# Patient Record
Sex: MALE | Race: BLACK OR AFRICAN AMERICAN | Employment: FULL TIME | URBAN - METROPOLITAN AREA
[De-identification: names, ages, dates, MRNs, and addresses within clinical notes are randomized per-mention and may not be internally consistent; named-entity substitution may affect disease eponyms.]

---

## 2020-07-13 PROBLEM — R42 DIZZINESS: Status: ACTIVE | Noted: 2020-07-13

## 2021-07-06 PROBLEM — Z00.00 PREVENTATIVE HEALTH CARE: Status: ACTIVE | Noted: 2021-07-06

## 2021-07-06 PROBLEM — E78.5 DYSLIPIDEMIA: Status: ACTIVE | Noted: 2021-07-06

## 2021-07-06 PROBLEM — R79.89 ELEVATED LFTS: Status: ACTIVE | Noted: 2021-07-06

## 2021-07-06 PROBLEM — R42 DIZZINESS: Status: RESOLVED | Noted: 2020-07-13 | Resolved: 2021-07-06

## 2021-07-27 PROBLEM — R97.20 ELEVATED PSA: Status: ACTIVE | Noted: 2021-07-27

## 2021-07-27 PROBLEM — E55.9 VITAMIN D DEFICIENCY: Status: ACTIVE | Noted: 2021-07-27

## 2021-07-28 PROBLEM — R80.0 ISOLATED PROTEINURIA: Status: ACTIVE | Noted: 2021-07-28

## 2021-08-02 PROBLEM — R19.02 LEFT UPPER QUADRANT ABDOMINAL MASS: Status: ACTIVE | Noted: 2021-08-02

## 2021-08-29 PROBLEM — I70.0 ABDOMINAL AORTIC ATHEROSCLEROSIS (HCC): Status: ACTIVE | Noted: 2021-08-29

## 2021-10-05 PROBLEM — I10 ESSENTIAL HYPERTENSION: Status: ACTIVE | Noted: 2021-10-05

## 2021-10-11 PROBLEM — R93.1 AGATSTON CAC SCORE, <100: Status: ACTIVE | Noted: 2021-10-11

## 2022-02-16 PROBLEM — R20.2 PARESTHESIA: Status: ACTIVE | Noted: 2022-02-16

## 2022-02-21 PROBLEM — C61 PROSTATE CANCER (HCC): Status: ACTIVE | Noted: 2022-02-21

## 2022-02-21 PROBLEM — R97.20 ELEVATED PSA: Status: RESOLVED | Noted: 2021-07-27 | Resolved: 2022-02-21

## 2022-03-04 RX ORDER — HEPARIN SODIUM 5000 [USP'U]/ML
5000 INJECTION, SOLUTION INTRAVENOUS; SUBCUTANEOUS ONCE
Status: CANCELLED | OUTPATIENT
Start: 2022-03-04 | End: 2022-03-04

## 2022-03-09 ENCOUNTER — ANESTHESIA EVENT (OUTPATIENT)
Dept: SURGERY | Facility: HOSPITAL | Age: 47
End: 2022-03-09
Payer: COMMERCIAL

## 2022-03-10 ENCOUNTER — ANESTHESIA (OUTPATIENT)
Dept: SURGERY | Facility: HOSPITAL | Age: 47
End: 2022-03-10
Payer: COMMERCIAL

## 2022-03-10 ENCOUNTER — HOSPITAL ENCOUNTER (OUTPATIENT)
Facility: HOSPITAL | Age: 47
Setting detail: HOSPITAL OUTPATIENT SURGERY
Discharge: HOME | End: 2022-03-10
Attending: UROLOGY | Admitting: UROLOGY
Payer: COMMERCIAL

## 2022-03-10 VITALS
BODY MASS INDEX: 24.88 KG/M2 | SYSTOLIC BLOOD PRESSURE: 148 MMHG | TEMPERATURE: 100 F | HEIGHT: 68 IN | WEIGHT: 164.13 LBS | RESPIRATION RATE: 16 BRPM | DIASTOLIC BLOOD PRESSURE: 84 MMHG | OXYGEN SATURATION: 98 % | HEART RATE: 92 BPM

## 2022-03-10 DIAGNOSIS — C61 PROSTATE CANCER (HCC): ICD-10-CM

## 2022-03-10 LAB
ANTIBODY SCREEN: NEGATIVE
RH BLOOD TYPE: POSITIVE

## 2022-03-10 PROCEDURE — 88309 TISSUE EXAM BY PATHOLOGIST: CPT | Performed by: UROLOGY

## 2022-03-10 PROCEDURE — 0VT04ZZ RESECTION OF PROSTATE, PERCUTANEOUS ENDOSCOPIC APPROACH: ICD-10-PCS | Performed by: UROLOGY

## 2022-03-10 PROCEDURE — 76942 ECHO GUIDE FOR BIOPSY: CPT | Performed by: ANESTHESIOLOGY

## 2022-03-10 PROCEDURE — 86901 BLOOD TYPING SEROLOGIC RH(D): CPT | Performed by: UROLOGY

## 2022-03-10 PROCEDURE — 88331 PATH CONSLTJ SURG 1 BLK 1SPC: CPT | Performed by: UROLOGY

## 2022-03-10 PROCEDURE — 8E0W4CZ ROBOTIC ASSISTED PROCEDURE OF TRUNK REGION, PERCUTANEOUS ENDOSCOPIC APPROACH: ICD-10-PCS | Performed by: UROLOGY

## 2022-03-10 PROCEDURE — 86850 RBC ANTIBODY SCREEN: CPT | Performed by: UROLOGY

## 2022-03-10 PROCEDURE — 86900 BLOOD TYPING SEROLOGIC ABO: CPT | Performed by: UROLOGY

## 2022-03-10 PROCEDURE — 0TSD4ZZ REPOSITION URETHRA, PERCUTANEOUS ENDOSCOPIC APPROACH: ICD-10-PCS | Performed by: UROLOGY

## 2022-03-10 PROCEDURE — 88307 TISSUE EXAM BY PATHOLOGIST: CPT | Performed by: UROLOGY

## 2022-03-10 PROCEDURE — 07BC4ZX EXCISION OF PELVIS LYMPHATIC, PERCUTANEOUS ENDOSCOPIC APPROACH, DIAGNOSTIC: ICD-10-PCS | Performed by: UROLOGY

## 2022-03-10 PROCEDURE — 88305 TISSUE EXAM BY PATHOLOGIST: CPT | Performed by: UROLOGY

## 2022-03-10 PROCEDURE — 88344 IMHCHEM/IMCYTCHM EA MLT ANTB: CPT | Performed by: UROLOGY

## 2022-03-10 RX ORDER — MEPERIDINE HYDROCHLORIDE 25 MG/ML
12.5 INJECTION INTRAMUSCULAR; INTRAVENOUS; SUBCUTANEOUS AS NEEDED
Status: DISCONTINUED | OUTPATIENT
Start: 2022-03-10 | End: 2022-03-10

## 2022-03-10 RX ORDER — CEFAZOLIN SODIUM/WATER 2 G/20 ML
2 SYRINGE (ML) INTRAVENOUS ONCE
Status: COMPLETED | OUTPATIENT
Start: 2022-03-10 | End: 2022-03-10

## 2022-03-10 RX ORDER — KETOROLAC TROMETHAMINE 30 MG/ML
INJECTION, SOLUTION INTRAMUSCULAR; INTRAVENOUS AS NEEDED
Status: DISCONTINUED | OUTPATIENT
Start: 2022-03-10 | End: 2022-03-10 | Stop reason: SURG

## 2022-03-10 RX ORDER — KETAMINE HYDROCHLORIDE 50 MG/ML
INJECTION, SOLUTION, CONCENTRATE INTRAMUSCULAR; INTRAVENOUS AS NEEDED
Status: DISCONTINUED | OUTPATIENT
Start: 2022-03-10 | End: 2022-03-10 | Stop reason: SURG

## 2022-03-10 RX ORDER — GLYCOPYRROLATE 0.2 MG/ML
INJECTION, SOLUTION INTRAMUSCULAR; INTRAVENOUS AS NEEDED
Status: DISCONTINUED | OUTPATIENT
Start: 2022-03-10 | End: 2022-03-10 | Stop reason: SURG

## 2022-03-10 RX ORDER — HYDROMORPHONE HYDROCHLORIDE 1 MG/ML
0.4 INJECTION, SOLUTION INTRAMUSCULAR; INTRAVENOUS; SUBCUTANEOUS EVERY 5 MIN PRN
Status: DISCONTINUED | OUTPATIENT
Start: 2022-03-10 | End: 2022-03-10

## 2022-03-10 RX ORDER — ONDANSETRON 2 MG/ML
INJECTION INTRAMUSCULAR; INTRAVENOUS AS NEEDED
Status: DISCONTINUED | OUTPATIENT
Start: 2022-03-10 | End: 2022-03-10 | Stop reason: SURG

## 2022-03-10 RX ORDER — MIDAZOLAM HYDROCHLORIDE 1 MG/ML
INJECTION INTRAMUSCULAR; INTRAVENOUS AS NEEDED
Status: DISCONTINUED | OUTPATIENT
Start: 2022-03-10 | End: 2022-03-10 | Stop reason: SURG

## 2022-03-10 RX ORDER — ENOXAPARIN SODIUM 100 MG/ML
40 INJECTION SUBCUTANEOUS ONCE
Status: COMPLETED | OUTPATIENT
Start: 2022-03-10 | End: 2022-03-10

## 2022-03-10 RX ORDER — NALOXONE HYDROCHLORIDE 0.4 MG/ML
80 INJECTION, SOLUTION INTRAMUSCULAR; INTRAVENOUS; SUBCUTANEOUS AS NEEDED
Status: DISCONTINUED | OUTPATIENT
Start: 2022-03-10 | End: 2022-03-10

## 2022-03-10 RX ORDER — BUPIVACAINE HYDROCHLORIDE 2.5 MG/ML
INJECTION, SOLUTION EPIDURAL; INFILTRATION; INTRACAUDAL AS NEEDED
Status: DISCONTINUED | OUTPATIENT
Start: 2022-03-10 | End: 2022-03-10 | Stop reason: HOSPADM

## 2022-03-10 RX ORDER — ACETAMINOPHEN 500 MG
1000 TABLET ORAL ONCE AS NEEDED
Status: DISCONTINUED | OUTPATIENT
Start: 2022-03-10 | End: 2022-03-10

## 2022-03-10 RX ORDER — ONDANSETRON 2 MG/ML
4 INJECTION INTRAMUSCULAR; INTRAVENOUS AS NEEDED
Status: DISCONTINUED | OUTPATIENT
Start: 2022-03-10 | End: 2022-03-10

## 2022-03-10 RX ORDER — SODIUM CHLORIDE, SODIUM LACTATE, POTASSIUM CHLORIDE, CALCIUM CHLORIDE 600; 310; 30; 20 MG/100ML; MG/100ML; MG/100ML; MG/100ML
INJECTION, SOLUTION INTRAVENOUS CONTINUOUS
Status: DISCONTINUED | OUTPATIENT
Start: 2022-03-10 | End: 2022-03-10

## 2022-03-10 RX ORDER — DEXAMETHASONE SODIUM PHOSPHATE 10 MG/ML
INJECTION, SOLUTION INTRAMUSCULAR; INTRAVENOUS AS NEEDED
Status: DISCONTINUED | OUTPATIENT
Start: 2022-03-10 | End: 2022-03-10 | Stop reason: SURG

## 2022-03-10 RX ORDER — DEXAMETHASONE SODIUM PHOSPHATE 4 MG/ML
VIAL (ML) INJECTION AS NEEDED
Status: DISCONTINUED | OUTPATIENT
Start: 2022-03-10 | End: 2022-03-10 | Stop reason: SURG

## 2022-03-10 RX ORDER — ACETAMINOPHEN 500 MG
1000 TABLET ORAL ONCE
Status: DISCONTINUED | OUTPATIENT
Start: 2022-03-10 | End: 2022-03-10 | Stop reason: HOSPADM

## 2022-03-10 RX ORDER — HYDROCODONE BITARTRATE AND ACETAMINOPHEN 5; 325 MG/1; MG/1
2 TABLET ORAL AS NEEDED
Status: DISCONTINUED | OUTPATIENT
Start: 2022-03-10 | End: 2022-03-10

## 2022-03-10 RX ORDER — LIDOCAINE HYDROCHLORIDE 10 MG/ML
INJECTION, SOLUTION EPIDURAL; INFILTRATION; INTRACAUDAL; PERINEURAL AS NEEDED
Status: DISCONTINUED | OUTPATIENT
Start: 2022-03-10 | End: 2022-03-10 | Stop reason: SURG

## 2022-03-10 RX ORDER — HYDROCODONE BITARTRATE AND ACETAMINOPHEN 5; 325 MG/1; MG/1
1 TABLET ORAL AS NEEDED
Status: DISCONTINUED | OUTPATIENT
Start: 2022-03-10 | End: 2022-03-10

## 2022-03-10 RX ORDER — BUPIVACAINE HYDROCHLORIDE 2.5 MG/ML
INJECTION, SOLUTION EPIDURAL; INFILTRATION; INTRACAUDAL AS NEEDED
Status: DISCONTINUED | OUTPATIENT
Start: 2022-03-10 | End: 2022-03-10 | Stop reason: SURG

## 2022-03-10 RX ORDER — ROCURONIUM BROMIDE 10 MG/ML
INJECTION, SOLUTION INTRAVENOUS AS NEEDED
Status: DISCONTINUED | OUTPATIENT
Start: 2022-03-10 | End: 2022-03-10 | Stop reason: SURG

## 2022-03-10 RX ORDER — DIPHENHYDRAMINE HYDROCHLORIDE 50 MG/ML
12.5 INJECTION INTRAMUSCULAR; INTRAVENOUS AS NEEDED
Status: DISCONTINUED | OUTPATIENT
Start: 2022-03-10 | End: 2022-03-10

## 2022-03-10 RX ORDER — METOCLOPRAMIDE HYDROCHLORIDE 5 MG/ML
INJECTION INTRAMUSCULAR; INTRAVENOUS AS NEEDED
Status: DISCONTINUED | OUTPATIENT
Start: 2022-03-10 | End: 2022-03-10 | Stop reason: SURG

## 2022-03-10 RX ORDER — NEOSTIGMINE METHYLSULFATE 1 MG/ML
INJECTION INTRAVENOUS AS NEEDED
Status: DISCONTINUED | OUTPATIENT
Start: 2022-03-10 | End: 2022-03-10 | Stop reason: SURG

## 2022-03-10 RX ADMIN — KETAMINE HYDROCHLORIDE 50 MG: 50 INJECTION, SOLUTION, CONCENTRATE INTRAMUSCULAR; INTRAVENOUS at 08:30:00

## 2022-03-10 RX ADMIN — METOCLOPRAMIDE HYDROCHLORIDE 10 MG: 5 INJECTION INTRAMUSCULAR; INTRAVENOUS at 08:20:00

## 2022-03-10 RX ADMIN — SODIUM CHLORIDE, SODIUM LACTATE, POTASSIUM CHLORIDE, CALCIUM CHLORIDE: 600; 310; 30; 20 INJECTION, SOLUTION INTRAVENOUS at 11:49:00

## 2022-03-10 RX ADMIN — SODIUM CHLORIDE, SODIUM LACTATE, POTASSIUM CHLORIDE, CALCIUM CHLORIDE: 600; 310; 30; 20 INJECTION, SOLUTION INTRAVENOUS at 09:44:00

## 2022-03-10 RX ADMIN — ROCURONIUM BROMIDE 50 MG: 10 INJECTION, SOLUTION INTRAVENOUS at 08:09:00

## 2022-03-10 RX ADMIN — LIDOCAINE HYDROCHLORIDE 50 MG: 10 INJECTION, SOLUTION EPIDURAL; INFILTRATION; INTRACAUDAL; PERINEURAL at 08:04:00

## 2022-03-10 RX ADMIN — MIDAZOLAM HYDROCHLORIDE 2 MG: 1 INJECTION INTRAMUSCULAR; INTRAVENOUS at 08:04:00

## 2022-03-10 RX ADMIN — NEOSTIGMINE METHYLSULFATE 2 MG: 1 INJECTION INTRAVENOUS at 11:47:00

## 2022-03-10 RX ADMIN — KETOROLAC TROMETHAMINE 30 MG: 30 INJECTION, SOLUTION INTRAMUSCULAR; INTRAVENOUS at 11:37:00

## 2022-03-10 RX ADMIN — DEXAMETHASONE SODIUM PHOSPHATE 2 MG: 10 INJECTION, SOLUTION INTRAMUSCULAR; INTRAVENOUS at 08:15:00

## 2022-03-10 RX ADMIN — ONDANSETRON 4 MG: 2 INJECTION INTRAMUSCULAR; INTRAVENOUS at 08:20:00

## 2022-03-10 RX ADMIN — CEFAZOLIN SODIUM/WATER 2 G: 2 G/20 ML SYRINGE (ML) INTRAVENOUS at 08:20:00

## 2022-03-10 RX ADMIN — DEXAMETHASONE SODIUM PHOSPHATE 4 MG: 4 MG/ML VIAL (ML) INJECTION at 08:20:00

## 2022-03-10 RX ADMIN — DEXAMETHASONE SODIUM PHOSPHATE 2 MG: 10 INJECTION, SOLUTION INTRAMUSCULAR; INTRAVENOUS at 08:13:00

## 2022-03-10 RX ADMIN — BUPIVACAINE HYDROCHLORIDE 30 ML: 2.5 INJECTION, SOLUTION EPIDURAL; INFILTRATION; INTRACAUDAL at 08:13:00

## 2022-03-10 RX ADMIN — BUPIVACAINE HYDROCHLORIDE 30 ML: 2.5 INJECTION, SOLUTION EPIDURAL; INFILTRATION; INTRACAUDAL at 08:15:00

## 2022-03-10 RX ADMIN — GLYCOPYRROLATE 0.4 MG: 0.2 INJECTION, SOLUTION INTRAMUSCULAR; INTRAVENOUS at 11:47:00

## 2022-03-10 RX ADMIN — SODIUM CHLORIDE, SODIUM LACTATE, POTASSIUM CHLORIDE, CALCIUM CHLORIDE: 600; 310; 30; 20 INJECTION, SOLUTION INTRAVENOUS at 08:04:00

## 2022-03-10 NOTE — ANESTHESIA PROCEDURE NOTES
Airway  Date/Time: 3/10/2022 8:10 AM  Urgency: elective      General Information and Staff    Patient location during procedure: OR  Anesthesiologist: Miguel Angel Calabrese MD  Performed: anesthesiologist     Indications and Patient Condition  Indications for airway management: anesthesia  Spontaneous Ventilation: absent  Sedation level: deep  Preoxygenated: yes  Patient position: sniffing  Mask difficulty assessment: 1 - vent by mask    Final Airway Details  Final airway type: endotracheal airway      Successful airway: ETT  Cuffed: yes   Successful intubation technique: Video laryngoscopy  Endotracheal tube insertion site: oral  Blade size: #3  ETT size (mm): 7.0    Cormack-Lehane Classification: grade I - full view of glottis  Placement verified by: chest auscultation and capnometry   Cuff volume (mL): 5  Measured from: lips  ETT to lips (cm): 22  Number of attempts at approach: 1    Additional Comments  VC clean, passed smoothly, atraumatically. OGT and soft bite block to molars.  Dentition unchanged

## 2022-03-10 NOTE — ANESTHESIA PROCEDURE NOTES
Regional Block  Performed by: Thor Eid MD  Authorized by: Thor Eid MD       General Information and Staff    Start Time:  3/10/2022 8:14 AM  End Time:  3/10/2022 8:15 AM  Anesthesiologist:  Thor Eid MD  Performed by: Anesthesiologist  Patient Location:  OR    Block Placement: Post Induction  Site Identification: real time ultrasound guided and image stored and retrievable    Block site/laterality marked before start: site marked  Reason for Block: at surgeon's request and post-op pain management    Preanesthetic Checklist: 2 patient identifers, IV checked, risks and benefits discussed, monitors and equipment checked, pre-op evaluation, timeout performed, anesthesia consent, sterile technique used, no prohibitive neurological deficits and no local skin infection at insertion site      Procedure Details    Patient Position:  Supine  Prep: ChloraPrep    Monitoring:  Cardiac monitor, continuous pulse ox and blood pressure cuff  Block Type:  TAP  Laterality:  Left  Injection Technique:  Single-shot    Needle    Needle Type:  Short-bevel and echogenic  Needle Gauge:  21 G  Needle Length:  100 mm  Needle Localization:  Ultrasound guidance  Reason for Ultrasound Use: appropriate spread of the medication was noted in real time and no ultrasound evidence of intravascular and/or intraneural injection            Assessment    Injection Assessment:  Good spread noted, negative resistance, negative aspiration for heme, incremental injection and low pressure  Heart Rate Change: No    - Patient tolerated block procedure well without evidence of immediate block related complications.      Medications      Additional Comments    Medication:  Bupivacaine 0.25% 30mL with 2mg PF dex

## 2022-03-10 NOTE — OPERATIVE REPORT
ROBOTIC-ASSISTED LAPAROSCOPIC RADICAL PROSTATECTOMY (RALP) OPERATIVE NOTE    PATIENT NAME: Natalya Andrade  YOB: 1975  DATE OF SERVICE: 3/10/2022    PREOPERATIVE DIAGNOSIS:  Intermediate risk prostate cancer    POSTOPERATIVE DIAGNOSIS:  Same    PROCEDURES PERFORMED:  1. Robotic-assisted laparoscopic radical prostatectomy (CPT: 25929)  2. Bilateral pelvic lymphadenectomy  3. Anterior urethropexy (CPT: 86204)    SURGEON:  Vu Edmond MD    ASSISTANTS:  SASHA Garnett    ANESTHESIA:  General Endotracheal    ANTIBIOTIC PROPHYLAXIS:  Ancef    SPECIMENS:  1. Prostate and seminal vesicles  2. Bilateral pelvic lymph nodes  3. Periprostatic fat  4. Bladder neck margin (for frozen section)  5. Final Bladder neck margin    ESTIMATED BLOOD LOSS:  50mL    DRAINS:  16F dietz catheter (15mL in the balloon)    COMPLICATIONS:  None     INDICATIONS FOR PROCEDURE:  Mr. Silvia Pool is a 51year old gentleman who was found to have unfavorable intermediate risk prostate adenocarcinoma. After being counseled about his treatment options, he elected to proceed with a radical prostatectomy with bilateral pelvic lymphadenectomy. See my pre-operative and office notes for procedural counseling, including the risks of the procedure. DESCRIPTION OF PROCEDURE:  After reviewing the indications for the procedure, informed consent was reviewed and signed by the patient. The patient was brought to the operating room and placed in the supine position on the OR table. SCD's were applied and all pressure points were carefully padded. At this point, general endotracheal anesthesia was successfully induced. The patient was then given perioperative antibiotics and subcutaneous enoxaparin prior to initiation of the procedure. At this point, he remained in the supine position and his abdomen was prepped with chlorhexidine and his genitals and upper thighs were prepped with betadine solution.   He was draped in the usual sterile fashion. A 16F dietz catheter was placed using a sterile technique and the balloon was inflated with 15cc of sterile water. The bladder was drained fully. We placed the patient in a steep Trendelenburg position to ensure there was no motion of the patient on the bed. At this point, a procedural timeout was performed where the patient and procedure were correctly identified using the patient's full name and date of birth. A Veress needle was inserted above the umbilicus into the peritoneal cavity. A drop test was performed to ensure there was no return of blood or enteric contents. At this point insufflation was initiated and there were acceptably low opening pressures. The abdomen was then insufflated to 15mmHg. At this point a transverse 8mm incision was made in the supraumbilical region through which a non-bladed 8mm trocar was inserted into the peritoneal cavity. The camera was then passed through this port and the peritoneum was inspected. There was no evidence of intraabdominal pathology. There were No adhesions noted. At this point, the remainder of the working ports were placed under direct vision. Two 8mm ports were placed in the left lower quadrant, an 8mm port was placed in the right lower quadrant, and a 12mm assistant port was placed in the far right lower quadrant. Finally, a 5mm assistant port was placed in the right upper quadrant. There was no evidence of injury to the intraperitoneal contents during port placement. The patient was again placed in steep Trendelenburg position and the robot was then successfully docked. All instruments were then placed under direct vision. I began by taking down the attachments of the sigmoid colon to the left pelvic sidewall in order to identify the external iliac vessels and ensure mobility of the sigmoid colon and rectum.      Next, the posterior peritoneum was incised using electrocautery in order to enter the rectovesical cul-de-sac and identify the vas deferens and seminal vesicles bilaterally. These structures were both dissected out carefully and the vasa were transected near the tips of the seminal vesicles. Minimal use of cautery was employed near the tips of the seminal vesicles in order to avoid any injury to the neurovascular bundles. Once the seminal vesicles were dissected free, I then created a plane between the rectum and posterior prostate using a combination of blunt and sharp dissection. Denonvilliers fascia was identified and dissection was carried distally to the posterior apex of the prostate. After completing the posterior dissection, attention was the turned to mobilizing the bladder. The right medial umbilical ligament was identified and grasped. Using electrocautery, the space of Retzius was entered. Dissection was carried laterally to the vas deferens and distally to the endopelvic fascia. I then performed the same procedure on the left side of the bladder. Finally, the urachus was divided using electrocautery to drop the bladder posteriorly. The prostate was then defatted using blunt dissection and electrocautery to fully expose the endopelvic fascia. This fat was sent off for pathologic evaluation as \"periprostatic fat. \"  Next, the endopelvic fascia on the right was sharply entered. The levator muscles were swept laterally, with care not to injure them. Dissection was carried towards the apex of the prostate, exposing the lateral aspect of the dorsal venous complex. Chris's ligament was divided with gentle bipolar electrocautery. The same procedure was then performed on the left side. After exposure of the dorsal venous complex, it was suture-ligated using a 2-0 V-Lock suture in a figure-of-eight fashion. An anterior urethropexy was performed using the same suture to lift the urethra anteriorly and tack it to the periosteum of the pubic bone.       Attention was then turned to  the bladder neck from the base of the prostate. The dietz catheter was placed on gentle traction to delineate the junction of the bladder and prostate and electrocautery was used to dissect the bladder neck from the prostate. The anterior bladder neck was entered, exposing the dietz catheter. The dietz catheter was then placed on anterior traction in order to facilitate the posterior bladder neck dissection. After coming through the posterior bladder neck, the posterior dissection plane was identified, exposing the previously dissected vasa and seminal vesicles. Care was taken to avoid the ureteral orifices. There was efflux of clear urine identified bilaterally. The vasa and seminal vesicles were then retracted anteriorly in order to delineate the prostatic pedicles. The prostatic pedicles were then divided in an antegrade fashion using Hem-o-lock clips. Care was taken to avoid the use of electrocautery near the pedicles in order to prevent inadvertent injury to the neurovascular bundles. The neurovascular bundles were then swept laterally off of the prostatic fascia in order to preserve them. This dissection was carried up to the prostatic apex. The quality of the nerve sparing was excellent bilaterally. Next, the dorsal venous complex was incised using electrocautery and sharp dissection. After coming through the dorsal vein, hemostasis appeared to be good. The urethra was then identified and blunt dissection was used to develop adequate urethral length. The urethra was then sharply divided anteriorly, the dietz catheter was retracted, and the posterior urethra was sharply incised. The prostate was then freed from the fossa and placed in the left upper quadrant for later retrieval.    After completing the prostatectomy portion of the procedure, attention was turned to the bilateral pelvic lymphadenectomy.   The right external iliac vein was identified and dissection was carried to the pelvic sidewall and down into the obturator space. The obturator nerve was identified and safely preserved. The lymphatic packet was controlled proximally and distally using Hem-o-lock clips to minimize the risk of postoperative pelvic lymphocele. A hem-o-lock clip was placed on the right-sided node packet in order to delineate it from the left-sided nodes. The specimen was sent off for pathologic analysis as \"right iliac/obturator/hypogastric lymph nodes. \"  The same procedure was then performed on the patient's left side. The left sided specimen was labeled as \"left iliac/obturator/hypogastric lymph nodes. \"    All specimens, including the prostate and seminal vesicles, were then placed in an endo-catch bag for later retrieval.     At this point, I noted that the bladder neck tissue appeared somewhat \"bulky\" and I was concerned that there may be some prostate tissue left on the bladder neck. For this reason, I sent a frozen section of tissue from the bladder neck. This was found to have some benign appearing prostate tissue within it. For this reason, I resected the bladder neck wider to remove any visible prostate tissue still on the bladder neck. I trimmed it back until there was a wider appearing bladder neck. This ring of bladder neck tissue was sent for permanent pathology as \"final bladder neck margin. \"    Finally, the vesicourethral anastomosis was performed in a running fashion using two 3-0 V-Lock sutures tied together. A new 16F dietz catheter was placed at the completion of the anastomosis and the balloon was inflated with 10cc of sterile water. It was irrigated successfully to ensure correct positioning. The anastomosis was tested by filling the bladder with 120cc of sterile saline and it appeared to be water-tight, without obvious leaks.       At this point the abdomen was desufflated and 8mm periumbilical incision was widened enough to accommodate removal of the endo-catch bag with the specimens inside. The fascia was closed using interrupted 0 PDS sutures in a figure-of-eight fashion. At this point the abdomen was re-insufflated and the camera was passed through the 12mm assistant port to inspect the peritoneum. The fascial closure from the extraction site was well-approximated and there was no snared omentum or bowel. At this point, all ports were removed under vision to assess for any bleeding. Finally the 12mm assistant port was removed after complete desufflation of the peritoneum. Skin was closed using 4-0 Monocryl sutures in a subcuticular fashion. Surgical glue was placed over each incision site. The patient was then awoken from anesthesia and transferred to the recovery room in stable condition. He tolerated the procedure well without any complications. At the completion of the procedure, our sponge, instrument, and needle count was correct. Please note that I was present for, and completed the entirety of this operation myself. PLAN:  He will be discharged to home today if he feels well enough to go in a few hours post-operatively. I will see him back in 1 week for dietz removal and to discuss pathology results.        Gray Terrazas MD  [facility]  Department of Urology  Office: [phone]

## 2022-03-10 NOTE — ANESTHESIA PROCEDURE NOTES
Regional Block  Performed by: Maynor Gonsalez MD  Authorized by: Maynor Gonsalez MD       General Information and Staff    Start Time:  3/10/2022 8:12 AM  End Time:  3/10/2022 8:13 AM  Anesthesiologist:  Maynor Gonsalez MD  Performed by: Anesthesiologist  Patient Location:  OR    Block Placement: Post Induction  Site Identification: real time ultrasound guided and image stored and retrievable    Block site/laterality marked before start: site marked  Reason for Block: at surgeon's request and post-op pain management    Preanesthetic Checklist: 2 patient identifers, IV checked, risks and benefits discussed, monitors and equipment checked, pre-op evaluation, timeout performed, anesthesia consent, sterile technique used, no prohibitive neurological deficits and no local skin infection at insertion site      Procedure Details    Patient Position:  Supine  Prep: ChloraPrep    Monitoring:  Cardiac monitor, continuous pulse ox and blood pressure cuff  Block Type:  TAP  Laterality:  Right  Injection Technique:  Single-shot    Needle    Needle Type:  Short-bevel and echogenic  Needle Gauge:  21 G  Needle Length:  100 mm  Needle Localization:  Ultrasound guidance  Reason for Ultrasound Use: appropriate spread of the medication was noted in real time and no ultrasound evidence of intravascular and/or intraneural injection            Assessment    Injection Assessment:  Good spread noted, negative resistance, negative aspiration for heme, incremental injection and low pressure  Heart Rate Change: No    - Patient tolerated block procedure well without evidence of immediate block related complications.      Medications      Additional Comments    Medication:  Bupivacaine 0.25% 30mL with 2mg PF dex

## 2022-03-10 NOTE — ANESTHESIA PROCEDURE NOTES
Peripheral IV  Date/Time: 3/10/2022 8:16 AM  Inserted by: Seven Alejandre MD    Placement  Needle size: 18 G  Laterality: left  Location: hand  Local anesthetic: none  Site prep: alcohol  Technique: anatomical landmarks  Attempts: 1

## (undated) DEVICE — EXOFIN TISSUE ADHESIVE 1.0ML

## (undated) DEVICE — LARGE NEEDLE DRIVER: Brand: ENDOWRIST

## (undated) DEVICE — DRAPE,TOP,102X53,STERILE: Brand: MEDLINE

## (undated) DEVICE — SOL  .9 1000ML BAG

## (undated) DEVICE — STERILE POLYISOPRENE POWDER-FREE SURGICAL GLOVES: Brand: PROTEXIS

## (undated) DEVICE — DRAIN SPONGES,6 PLY: Brand: EXCILON

## (undated) DEVICE — TIP COVER ACCESSORY

## (undated) DEVICE — ABSORBABLE HEMOSTAT (OXIDIZED REGENERATED CELLULOSE, U.S.P.): Brand: SURGICEL

## (undated) DEVICE — INSUFFLATION NEEDLE TO ESTABLISH PNEUMOPERITONEUM.: Brand: INSUFFLATION NEEDLE

## (undated) DEVICE — BIPOLAR GRASPER, LONG: Brand: ENDOWRIST

## (undated) DEVICE — SUTURE PDS II 0 CT-1

## (undated) DEVICE — Device

## (undated) DEVICE — SUTURE VLOC 90 3-0 9" 1944

## (undated) DEVICE — VISUALIZATION SYSTEM: Brand: CLEARIFY

## (undated) DEVICE — VIOLET BRAIDED (POLYGLACTIN 910), SYNTHETIC ABSORBABLE SUTURE: Brand: COATED VICRYL

## (undated) DEVICE — BLADELESS OBTURATOR: Brand: WECK VISTA

## (undated) DEVICE — COLUMN DRAPE

## (undated) DEVICE — 40580 - THE PINK PAD - ADVANCED TRENDELENBURG POSITIONING KIT: Brand: 40580 - THE PINK PAD - ADVANCED TRENDELENBURG POSITIONING KIT

## (undated) DEVICE — PROGRASP FORCEPS: Brand: ENDOWRIST

## (undated) DEVICE — [HIGH FLOW INSUFFLATOR,  DO NOT USE IF PACKAGE IS DAMAGED,  KEEP DRY,  KEEP AWAY FROM SUNLIGHT,  PROTECT FROM HEAT AND RADIOACTIVE SOURCES.]: Brand: PNEUMOSURE

## (undated) DEVICE — PLUMEPORT ACTIV LAPAROSCOPIC SMOKE FILTRATION DEVICE: Brand: PLUMEPORT ACTIVE

## (undated) DEVICE — SCD SLEEVE KNEE HI BLEND

## (undated) DEVICE — TROCAR: Brand: KII FIOS FIRST ENTRY

## (undated) DEVICE — ANCHOR TISSUE RETRIEVAL SYSTEM, BAG SIZE 175 ML, PORT SIZE 10 MM: Brand: ANCHOR TISSUE RETRIEVAL SYSTEM

## (undated) DEVICE — DRAIN ROUND HUBLESS 15FR

## (undated) DEVICE — ELECTRO LUBE IS A SINGLE PATIENT USE DEVICE THAT IS INTENDED TO BE USED ON ELECTROSURGICAL ELECTRODES TO REDUCE STICKING.: Brand: KEY SURGICAL ELECTRO LUBE

## (undated) DEVICE — MONOPOLAR CURVED SCISSORS: Brand: ENDOWRIST

## (undated) DEVICE — SUTURE MONOCRYL 4-0 PS-2

## (undated) DEVICE — STERILE SYNTHETIC NEOPRENE POWDER-FREE SURGICAL GLOVES WITH NITRILE COATING, SMOOTH FINISH, STRAIGHT FINGER: Brand: PROTEXIS

## (undated) DEVICE — ARM DRAPE

## (undated) DEVICE — CANNULA SEAL

## (undated) DEVICE — LAPAROTOMY SPONGE - RF AND X-RAY DETECTABLE PRE-WASHED: Brand: SITUATE

## (undated) DEVICE — SUTURE VLOC 180 2-0 12" 0315

## (undated) DEVICE — ROBOTIC UROLOGY PROSTATE: Brand: MEDLINE INDUSTRIES, INC.

## (undated) DEVICE — DRAIN RELIAVAC 100CC

## (undated) DEVICE — CLIP HEMOLOK LARGE PURPLE

## (undated) DEVICE — ABSORBABLE WOUND CLOSURE DEVICE: Brand: V-LOC 90